# Patient Record
(demographics unavailable — no encounter records)

---

## 2025-02-03 NOTE — PHYSICAL EXAM
[Appropriately responsive] : appropriately responsive [Alert] : alert [No Acute Distress] : no acute distress [Labia Majora] : normal [Labia Minora] : normal [Uterine Prolapse] : uterine prolapse [Normal] : normal [FreeTextEntry4] : ring with support removed, cleaned and replaced.

## 2025-03-10 NOTE — PLAN
[FreeTextEntry1] : Will refer to Heme/Onc but she is having trouble getting an appointment. Will reach out to the cancer caner direct team to schedule her an appointment. I recommended she discuss palliative care in addition to other treatments. She is interested in hospice when the time comes to make that decision as well.  Discussed clean eating (eg Mediterranean style eating plan) and regular exercise/staying as physically active as possible.  Include balance exercises and strength training and core strengthening exercises for bone health and to decrease risk for falls.  Reviewed importance of good self care (e.g. meditation, yoga, adequate rest, regular exercise, magnesium, clean eating, etc.).  Follow up for next physical in 10/2025 or sooner based on workup from Oncology.  Face-to-face time spent with patient, over half in discussion of the above diagnoses and treatment plan: 40 minutes.

## 2025-03-10 NOTE — ASSESSMENT
[FreeTextEntry1] : Patient has a history of asthma, GERD, hypertension, hypothyroidism, impaired glucose tolerance, osteopenia, chronic back pain, and scoliosis. She has a new diagnosis of pancreatic cancer.  She had 2 stents placed in her bile duct. She was monitored in the hospital and discharged on Thursday. She returned to the ER on Saturday with pain and had run out of Dilaudid. She had another CT scan and her LFTs were improved. She was discharged with a prescription for Dilaudid 2 mg (15 tablets). She is using this every 6 hours and needs a refill.  She was referred to Oncology, Dr. Ronquillo or Dr. Garrett, on 3/7 but she stated that she wanted to discuss this in person. Her daughter also called [facility] but does not have an appointment yet.  She is not sure that she wants to pursue aggressive treatment for her cancer if it will prolong her life but she will be uncomfortable. She would prefer to be comfortable even if that means her life expectancy is shorter.  She is taking Gabapentin for her pain in addition to Dilaudid.  She has gained 17 pounds of water weight. She doubled her Lasix and has lost 3 pounds. She developed cramps in her hands however because she forgot to take her potassium with the Lasix. I advised her to take the potassium each time she takes Lasix, and increase the potassium dose according to the Lasix dose.  She has been constipated as well. She takes Dulcolax but it is not helping. She will try Miralax.

## 2025-03-10 NOTE — HEALTH RISK ASSESSMENT
[No falls in past year] : Patient reported no falls in the past year [0] : 2) Feeling down, depressed, or hopeless: Not at all (0) [PHQ-2 Negative - No further assessment needed] : PHQ-2 Negative - No further assessment needed [Never] : Never [[id number]] : 0

## 2025-03-10 NOTE — PHYSICAL EXAM
[No Acute Distress] : no acute distress [Well Nourished] : well nourished [Well Developed] : well developed [Well-Appearing] : well-appearing [Normal Sclera/Conjunctiva] : normal sclera/conjunctiva [PERRL] : pupils equal round and reactive to light [EOMI] : extraocular movements intact [Normal Outer Ear/Nose] : the outer ears and nose were normal in appearance [Normal Oropharynx] : the oropharynx was normal [No JVD] : no jugular venous distention [No Lymphadenopathy] : no lymphadenopathy [Supple] : supple [No Respiratory Distress] : no respiratory distress  [No Accessory Muscle Use] : no accessory muscle use [Clear to Auscultation] : lungs were clear to auscultation bilaterally [Normal Rate] : normal rate  [Regular Rhythm] : with a regular rhythm [Normal S1, S2] : normal S1 and S2 [No Murmur] : no murmur heard [No Carotid Bruits] : no carotid bruits [No Varicosities] : no varicosities [Pedal Pulses Present] : the pedal pulses are present [No Edema] : there was no peripheral edema [No Extremity Clubbing/Cyanosis] : no extremity clubbing/cyanosis [Soft] : abdomen soft [Non Tender] : non-tender [Non-distended] : non-distended [No Masses] : no abdominal mass palpated [Normal Bowel Sounds] : normal bowel sounds [No CVA Tenderness] : no CVA  tenderness [No Spinal Tenderness] : no spinal tenderness [No Joint Swelling] : no joint swelling [Grossly Normal Strength/Tone] : grossly normal strength/tone [No Rash] : no rash [Coordination Grossly Intact] : coordination grossly intact [No Focal Deficits] : no focal deficits [Normal Gait] : normal gait [Normal Affect] : the affect was normal [Normal Insight/Judgement] : insight and judgment were intact

## 2025-03-10 NOTE — HISTORY OF PRESENT ILLNESS
[Other: _____] : [unfilled] [FreeTextEntry1] : NICKOLAS SHAW is a 82 year old female here for a follow up visit. [de-identified] : Patient has a history of asthma, GERD, hypertension, hypothyroidism, impaired glucose tolerance, osteopenia, chronic back pain, and scoliosis.  Her last visit was in 9/2024 for her annual physical. Her labs were all stable and she was advised to follow up in 6-12 months. She scheduled this visit for follow up at that time.  In the meantime she was diagnosed with pancreatic cancer. She called the office on 2/28/25 stating that she was in [place] with her sister and had right upper quadrant abdominal pain associated with a fever, nausea, and vomiting. Her symptoms were suggestive of a gallstone and she was advised to go to the nearest ER for evaluation.   She had an endoscopic ultrasound done in [place] on 3/3/25 for a pancreatic mass seen on an MRI. Her daughter called the office stating that the diagnosis was pancreatic cancer and requested a referral to Oncology.

## 2025-03-11 NOTE — RESULTS/DATA
[FreeTextEntry1] : 3/1/25 CT A/P: Limited evaluation due to the absence of intravenous contrast. Redemonstrated biliary and pancreatic ductal dilation likely secondary to a pancreatic malignancy, which is questionably identified on the current study in the uncinate process. massively dilated gallbladder, likely related to biliary obstruction by the presumed pancreatic mass. Associated mild gallbladder mural thickening and minimal adjacent fat stranding likely reflects superimposed inflammation. Indeterminate upper abdominal subcentimeter in short axis lymph nodes, 1.0 x 0.9 cm. Indeterminate subcentimeter left renal lesion, which may represent a proteinaceous cyst or potentially a small renal neoplasm, 0.7 cm.   3/1/25 US Abd: Moderate intrahepatic bile duct dilation of the extrahepatic bile duct up to 1.4 cm and dilation of the main pancreatic duct up to 8 mm. These findings may be secondary to an obstructing mass or less likely a ductal calculus (dilation of both biliary and pancreatic ducts is atypical for a calculus). Dilated gallbladder with questionable mild wall thickening but no evidence of gallstones, favored to be related to the process causing biliary and pancreatic ductal obstruction.

## 2025-03-11 NOTE — CONSULT LETTER
[Dear  ___] : Dear  [unfilled], [Consult Letter:] : I had the pleasure of evaluating your patient, [unfilled]. [Please see my note below.] : Please see my note below. [Consult Closing:] : Thank you very much for allowing me to participate in the care of this patient.  If you have any questions, please do not hesitate to contact me. [Sincerely,] : Sincerely, [DrAlberto  ___] : Dr. HARRISON [FreeTextEntry3] : Dr. Abel Garrett

## 2025-03-14 NOTE — REVIEW OF SYSTEMS
[Fatigue] : fatigue [Lower Ext Edema] : lower extremity edema [Abdominal Pain] : abdominal pain [Constipation] : constipation [Diarrhea: Grade 0] : Diarrhea: Grade 0 [Anxiety] : anxiety [Depression] : depression [Negative] : Allergic/Immunologic [Fever] : no fever [Chills] : no chills [Night Sweats] : no night sweats [Recent Change In Weight] : ~T no recent weight change

## 2025-03-14 NOTE — PHYSICAL EXAM
[Restricted in physically strenuous activity but ambulatory and able to carry out work of a light or sedentary nature] : Status 1- Restricted in physically strenuous activity but ambulatory and able to carry out work of a light or sedentary nature, e.g., light house work, office work [Normal] : affect appropriate [de-identified] : + LE edema, L>R

## 2025-03-14 NOTE — HISTORY OF PRESENT ILLNESS
[Disease: _____________________] : Disease: [unfilled] [de-identified] : CEA 1.6, CA 19-9 22 [T: ___] : T[unfilled] [N: ___] : N[unfilled] [M: ___] : M[unfilled] [AJCC Stage: ____] : AJCC Stage: [unfilled] [de-identified] : 81 y/o F presents for management of pancreatic adenocarcinoma.   F PMHx asthma, GERD, HTN, hypothyroidism, impaired glucose tolerance, osteopenia, chronic back pain.  Patient presented to [facility] ED 3/1/25 with RUQ abdominal pain x 2 weeks. Associated with nausea/vomiting, fatigue and 10 lbs unintentional weight loss.  Labs notable for , , Alk Phos 510, bilirubin 2/7, lipase 458. CT AP without contrast showed biliary and pancreatic ductal dilation likely secondary to a pancreatic malignancy, which is questionably identified on the current study in the uncinate process. Recommend further evaluation with enhanced abdominal MRI or endoscopic ultrasound w/ possible biopsy. Massively dilated gallbladder, likely related to biliary obstruction by the presumed pancreatic mass. Associated mild gallbladder mural thickening and minimal adjacent fat stranding likely reflects superimposed inflammation. MRI abd without contrast 3/2/25 limited, redemonstration biliary and pancreatic ductal dilation likely secondary to a pancreatic head malignancy. No discrete mass is identified on unenhanced MRI in the region of abrupt duct termination. A 2.5 cm branching cystic lesion in the medial pancreatic uncinate process may reflect localized sidebranch ductal dilation vs. a sidebranch IPMN. Massively dilated GB, likely related to biliary obstruction by the presumed pancreatic mass, indeterminate upper abdominal subcentimeter in short axis lymph nodes, large hiatal hernia. She had ERCP 3/3/25 w/ biliary sphincterotomy, insertion of uncovered self expandable metal stent into the CBD. There was severe stenosis in the distal common bile duct concerning for malignant stricture, cytology performed. By Endoscopic criteria, she is T2N0. Pathology showed head of pancreas mass bx consistent with adenocarcinoma, moderately differentiated, bile duct brushings w/ few atypical glandular cells, suspicious for adenocarcinoma. Patient discharged wpain medication and recommendations to follow up with Medical Oncololgy outpatient.   Social: Lives alone w/ 3 Cats. 3 children (Chyna, Dick, Mallory) Chyna is her HCP. 5 grandchildren  Fhx: Father colon cancer dx 89, mother breast cancer 39, brother prostate early 60s, brother prostate & esophageal cancer alive, half brother prostate cancer alive PSHx: carotid artery stenosis s/p endarterectomy (12/2009), angioplasty/atherectomy of left SFA, b/l knee replacements, cataract removal  Smoking: never ETOH: never drug use: none   [de-identified] : adenocarcinoma, moderately differentiated  [FreeTextEntry1] : initial visit  [de-identified] : Here with her daughter, Chyna Nair (HCP)  C/o abdominal pain, pain is severe at times, takes Dilaudod 2 mg tabs Q 4-6 hrs which helps control the pain temporarily. Waits till pain is 9/10 before taking it.  lower appetite, fatigue C/o left groin pain x 48 hours, a/w L LE edema, worse than R + constipation, taking Miralax daily x 2 days, dulcolax does not help  LE edema b/l chronic. but recently gained 17 lb fluid gain, takes Lasix chronically due to venous insufficiency  tired but able to do ADLs still working, drives, lives alone has 3 cats which she cares for   taking Dilaudid 2 mg = every 6

## 2025-03-14 NOTE — HISTORY OF PRESENT ILLNESS
[Disease: _____________________] : Disease: [unfilled] [de-identified] : CEA 1.6, CA 19-9 22 [T: ___] : T[unfilled] [N: ___] : N[unfilled] [M: ___] : M[unfilled] [AJCC Stage: ____] : AJCC Stage: [unfilled] [de-identified] : 83 y/o F presents for management of pancreatic adenocarcinoma.   F PMHx asthma, GERD, HTN, hypothyroidism, impaired glucose tolerance, osteopenia, chronic back pain.  Patient presented to [facility] ED 3/1/25 with RUQ abdominal pain x 2 weeks. Associated with nausea/vomiting, fatigue and 10 lbs unintentional weight loss.  Labs notable for , , Alk Phos 510, bilirubin 2/7, lipase 458. CT AP without contrast showed biliary and pancreatic ductal dilation likely secondary to a pancreatic malignancy, which is questionably identified on the current study in the uncinate process. Recommend further evaluation with enhanced abdominal MRI or endoscopic ultrasound w/ possible biopsy. Massively dilated gallbladder, likely related to biliary obstruction by the presumed pancreatic mass. Associated mild gallbladder mural thickening and minimal adjacent fat stranding likely reflects superimposed inflammation. MRI abd without contrast 3/2/25 limited, redemonstration biliary and pancreatic ductal dilation likely secondary to a pancreatic head malignancy. No discrete mass is identified on unenhanced MRI in the region of abrupt duct termination. A 2.5 cm branching cystic lesion in the medial pancreatic uncinate process may reflect localized sidebranch ductal dilation vs. a sidebranch IPMN. Massively dilated GB, likely related to biliary obstruction by the presumed pancreatic mass, indeterminate upper abdominal subcentimeter in short axis lymph nodes, large hiatal hernia. She had ERCP 3/3/25 w/ biliary sphincterotomy, insertion of uncovered self expandable metal stent into the CBD. There was severe stenosis in the distal common bile duct concerning for malignant stricture, cytology performed. By Endoscopic criteria, she is T2N0. Pathology showed head of pancreas mass bx consistent with adenocarcinoma, moderately differentiated, bile duct brushings w/ few atypical glandular cells, suspicious for adenocarcinoma. Patient discharged wpain medication and recommendations to follow up with Medical Oncololgy outpatient.   Social: Lives alone w/ 3 Cats. 3 children (Chyna, Dick, Mallory) Chyna is her HCP. 5 grandchildren  Fhx: Father colon cancer dx 89, mother breast cancer 39, brother prostate early 60s, brother prostate & esophageal cancer alive, half brother prostate cancer alive PSHx: carotid artery stenosis s/p endarterectomy (12/2009), angioplasty/atherectomy of left SFA, b/l knee replacements, cataract removal  Smoking: never ETOH: never drug use: none   [de-identified] : adenocarcinoma, moderately differentiated  [FreeTextEntry1] : initial visit  [de-identified] : Here with her daughter, Chyna Nair (HCP)  C/o abdominal pain, pain is severe at times, takes Dilaudod 2 mg tabs Q 4-6 hrs which helps control the pain temporarily. Waits till pain is 9/10 before taking it.  lower appetite, fatigue C/o left groin pain x 48 hours, a/w L LE edema, worse than R + constipation, taking Miralax daily x 2 days, dulcolax does not help  LE edema b/l chronic. but recently gained 17 lb fluid gain, takes Lasix chronically due to venous insufficiency  tired but able to do ADLs still working, drives, lives alone has 3 cats which she cares for   taking Dilaudid 2 mg = every 6

## 2025-03-14 NOTE — ASSESSMENT
[Future Reassessment of Pain Scale] : Future reassessment of pain scale    [Medication(s)] : Medication(s) [Referred to Palliative/Pain management] : Referred to Palliative/Pain management [Palliative Care Plan] : not applicable at this time [FreeTextEntry1] : unknown at this time

## 2025-03-14 NOTE — PHYSICAL EXAM
[Restricted in physically strenuous activity but ambulatory and able to carry out work of a light or sedentary nature] : Status 1- Restricted in physically strenuous activity but ambulatory and able to carry out work of a light or sedentary nature, e.g., light house work, office work [Normal] : affect appropriate [de-identified] : + LE edema, L>R

## 2025-03-21 NOTE — ASSESSMENT
[______] : HCP: [unfilled] [FreeTextEntry1] : 82yoF with:   # Pancreatic Adenocarcinoma - At this time patient has decided for a palliative/comfort-focused approach.    # Pain 2/2 Neoplasm - c/w Methadone 2.5mg BID, PRN Hydromorphone 2mg   # Nausea - c/w PRN ondansetron   # b/l LE edema - chronic issue 2/2 venous insufficiency. Wears compression stockings and uses furosemide 20mg PRN.  #  Encounter for palliative care- emotional support provided. Explained the role of palliative care in enhancing quality of life in the setting of serious illness. Patient has HCP and advance directive forms at home. Requested a copy be sent in for our records.   Follow up in 2 weeks, call sooner with questions or issues.

## 2025-03-21 NOTE — DATA REVIEWED
[FreeTextEntry1] : CT Chest (3/14/2025) FINDINGS:  LUNGS/AIRWAYS/PLEURA:Patent trachea and bronchi. Right lower lobe calcified granuloma. Trace left pleural effusion.  LYMPH NODES/MEDIASTINUM: Moderate hiatal hernia. No lymphadenopathy.  HEART/VASCULATURE: Normal sized heart and aorta. No pericardial effusion. Coronary artery calcifications.  UPPER ABDOMEN: Partially included pneumobilia.  BONES/SOFT TISSUES: Multilevel spondylosis.  IMPRESSION: Trace left pleural effusion.  --- End of Report ---   US NINA LE (3/14/2025) FINDINGS:  RIGHT: Normal compressibility of the RIGHT common femoral, femoral and popliteal veins. Doppler examination shows normal spontaneous and phasic flow. No RIGHT calf vein thrombosis is detected.  LEFT: Normal compressibility of the LEFT common femoral, femoral and popliteal veins. Doppler examination shows normal spontaneous and phasic flow. No LEFT calf vein thrombosis is detected.  IMPRESSION: No evidence of deep venous thrombosis in either lower extremity.

## 2025-03-21 NOTE — HISTORY OF PRESENT ILLNESS
[FreeTextEntry1] : 82yoF with recently diagnosed pancreatic adenocarcinoma presents for initial palliative care visit, referred by Dr. King.  PMH significant for cataracts, Carotid Thromboendarterectomy, GERD, HTN, hyperlipidemia, hypothyroidism, osteopenia, chronic back pain, PAD, scoliosis, chronic venous insufficiency, b/l knee replacements.  Patient was in [place] for the annual flower show in late February when on 2/27/25 at her sister's house she developed chills, vomiting. She presented to [facility] ED 3/1/25 with RUQ pain, n/v, fatigue.  Labs notable for , , Alk Phos 510, bilirubin 2/7, lipase 458. CT AP without contrast showed biliary and pancreatic ductal dilation likely secondary to a pancreatic malignancy. Massively dilated gallbladder, likely related to biliary obstruction by the presumed pancreatic mass. Associated mild gallbladder mural thickening and minimal adjacent fat stranding likely reflects superimposed inflammation. She had ERCP 3/3/25 w/ biliary sphincterotomy, insertion of uncovered self-expandable metal stent into the CBD. There was severe stenosis in the distal common bile duct concerning for malignant stricture, cytology performed. By Endoscopic criteria, she is T2N0. Pathology showed head of pancreas mass bx c/w adenocarcinoma, moderately differentiated, bile duct brushings w/ few atypical glandular cells, suspicious for adenocarcinoma.   Main reason for which patient is referred today is to discuss pain and symptom management. She is accompanied by her daughter, Chyna.  Pt has received various oncologic opinions and has decided to pursue a palliative/comfort-focused approach. She has been suffering with pain in her RUQ/epigastrium and was previously using hydromorphone 2mg very frequently.  She was started on opioid regimen and pain is presently better controlled.   Current analgesic regimen: Methadone 2.5mg BID (started on 3/14/25) Hydromorphone 2mg PRN   ROS: +appetite loss +fatigue +b/l LE edema - chronic issue form chronic venous insufficiency - on PRN furosemide 20mg + constipation - controlled with senna and miralax daily +chronic back pain - limits her ability to walk a long distance +sleep has improved since initiation of methadone last week +occasional nausea - uses ondansetron PRN Remainder of ROS non-contributory  Uses a walking stick for assistance with ambulation. She had a fall two months ago. She wears a life alert wristwatch.  Patient is , lives alone in a town house with an elevator. She has 3 cats (Emperatriz, Miguel and Gunjan). She has 3 children (Chyna, Dick, Mallory), 5 grandchildren. Chyna is her HCP. 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  She enjoys photography, driving. Not Mosque or spiritual.   PCP: Dr. Reagan Ma Vascular: Dr. Ryder ([facility])   I-STOP Ref#: [id number]

## 2025-03-21 NOTE — PHYSICAL EXAM
[General Appearance - Alert] : alert [General Appearance - In No Acute Distress] : in no acute distress [Sclera] : the sclera and conjunctiva were normal [Normal Oral Mucosa] : normal oral mucosa [Neck Appearance] : the appearance of the neck was normal [] : no respiratory distress [Auscultation Breath Sounds / Voice Sounds] : lungs were clear to auscultation bilaterally [Heart Rate And Rhythm] : heart rate was normal and rhythm regular [Heart Sounds] : normal S1 and S2 [FreeTextEntry1] : b/l LE edema, pt wearing compression stockings [Bowel Sounds] : normal bowel sounds [Abdomen Soft] : soft [Abdomen Tenderness] : non-tender [Skin Color & Pigmentation] : normal skin color and pigmentation [No Focal Deficits] : no focal deficits [Oriented To Time, Place, And Person] : oriented to person, place, and time [Affect] : the affect was normal

## 2025-04-02 NOTE — REASON FOR VISIT
[Other: _____] : [unfilled] [Home] : at home, [unfilled] , at the time of the visit. [Medical Office: ([facility])___] : at the medical office located in  [Telehealth (audio & video)] : This visit was provided via telehealth using real-time 2-way audio visual technology. [Verbal consent obtained from patient] : the patient, [unfilled] [Follow-Up] : a follow-up visit

## 2025-04-02 NOTE — REASON FOR VISIT
[Other: _____] : [unfilled] [Home] : at home, [unfilled] , at the time of the visit. [Medical Office: ([facility])___] : at the medical office located in  [Telehealth (audio & video)] : This visit was provided via telehealth using real-time 2-way audio visual technology. [Verbal consent obtained from patient] : the patient, [unfilled] [Follow-Up] : a follow-up visit

## 2025-04-02 NOTE — ASSESSMENT
[______] : HCP: [unfilled] [FreeTextEntry1] : 82yoF with:   # Pancreatic Adenocarcinoma - At this time patient has decided for a palliative/comfort-focused approach.    # Dizziness - Patient states she is dizzy with ambulation, currently alone in the home. Recommend evaluation by ED for fluid status and to r/o cardiac causes. Patient states she spoke with her nephew who recommended she stay home and repeat dose of ondansetron, consume two glasses of fluids. Patient states her family friend will come over to assist her. Educated on falls prevention. [facility] ambulance number provided to patient.   # Nausea - Rx olanzapine 2.5mg QHS. May continue PRN ondansetron, recommend increasing the dose to 8mg q8hr. Recommend avoiding taking medications on a empty stomach.    # Blood in stool with unclear etiology - Possible ?hemorrhoids, although patient denies presently. Recommend physical exam and lab work to rule out drop in H/H. Patient defers.   # Pain 2/2 Neoplasm - Controlled. C/w Methadone 2.5mg BID, PRN Hydromorphone 2mg.   # b/l LE edema - chronic issue 2/2 venous insufficiency. Wears compression stockings and uses furosemide 20mg PRN.  #  Encounter for palliative care- emotional support provided. Explained the role of palliative care in enhancing quality of life in the setting of serious illness. Patient has HCP and advance directive forms at home. Requested a copy be sent in for our records.   Follow up in 2 weeks, call sooner with questions or issues.

## 2025-04-02 NOTE — HISTORY OF PRESENT ILLNESS
[FreeTextEntry1] : 82yoF with recently diagnosed pancreatic adenocarcinoma presents for initial palliative care visit, referred by Dr. King.  PMH significant for cataracts, Carotid Thromboendarterectomy, GERD, HTN, hyperlipidemia, hypothyroidism, osteopenia, chronic back pain, PAD, scoliosis, chronic venous insufficiency, b/l knee replacements.  Patient was in [place] for the annual flower show in late February when on 2/27/25 at her sister's house she developed chills, vomiting. She presented to [facility] ED 3/1/25 with RUQ pain, n/v, fatigue.  Labs notable for , , Alk Phos 510, bilirubin 2/7, lipase 458. CT AP without contrast showed biliary and pancreatic ductal dilation likely secondary to a pancreatic malignancy. Massively dilated gallbladder, likely related to biliary obstruction by the presumed pancreatic mass. Associated mild gallbladder mural thickening and minimal adjacent fat stranding likely reflects superimposed inflammation. She had ERCP 3/3/25 w/ biliary sphincterotomy, insertion of uncovered self-expandable metal stent into the CBD. There was severe stenosis in the distal common bile duct concerning for malignant stricture, cytology performed. By Endoscopic criteria, she is T2N0. Pathology showed head of pancreas mass bx c/w adenocarcinoma, moderately differentiated, bile duct brushings w/ few atypical glandular cells, suspicious for adenocarcinoma.   Main reason for which patient is referred today is to discuss pain and symptom management. She is accompanied by her daughter, Chyna.  Pt has received various oncologic opinions and has decided to pursue a palliative/comfort-focused approach. She has been suffering with pain in her RUQ/epigastrium and was previously using hydromorphone 2mg very frequently.  She was started on opioid regimen and pain is presently better controlled.   Interval history (4/2/25): Patient presents for urgent telehealth visit.  She has been feeling unwell since waking up this morning with nausea and retching. She reports using PRN ondansetron 4mg at 10am and 12pm with no relief in her symptoms. She ate yogurt this afternoon which exacerbated her symptoms.  She reports constipation for three days which was relieved with senna 3 tablets last night. She moved her bowels twice today with blood noted in the toilet bowl and with wiping. Unable to quantify how much blood was passed but states the water in the toilet bowl turned red.   She reports dizziness with ambulating, this resolves when she closes her eyes. Denies chest pain, shortness of breath, near syncope, falls.  Pain controlled on her current pain regimen. Increased anxiety regarding being alone and feeling unwell. She has anxiety about going to the hospital due to a bad experience in the past with a knee replacement.   Current analgesic regimen: Methadone 2.5mg BID (started on 3/14/25) Hydromorphone 2mg PRN   ROS: +appetite loss +nausea with dry heaves - uses ondansetron PRN +fatigue +b/l LE edema - chronic issue form chronic venous insufficiency - on PRN furosemide 20mg + constipation - controlled with senna and miralax daily +chronic back pain - limits her ability to walk a long distance +sleep has improved since initiation of methadone last week Remainder of ROS non-contributory  Uses a walking stick for assistance with ambulation. She had a fall two months ago. She wears a life alert wristwatch.  Patient is , lives alone in a town house with an elevator. She has 3 cats (Emperatriz, Miguel and Gunjan). She has 3 children (Chyna, Dick, Mallory), 5 grandchildren. Chyna is her HCP. 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  She enjoys photography, driving. Not Yazdanism or spiritual.   PCP: Dr. Reagan Ma Vascular: Dr. Ryder ([facility])   I-STOP Ref#: [id number]

## 2025-04-02 NOTE — ASSESSMENT
[______] : HCP: [unfilled] [FreeTextEntry1] : 82yoF with:   # Pancreatic Adenocarcinoma - At this time patient has decided for a palliative/comfort-focused approach.    # Dizziness - Patient states she is dizzy with ambulation, currently alone in the home. Recommend evaluation by ED for fluid status and to r/o cardiac causes. Patient states she spoke with her nephew who recommended she stay home and repeat dose of ondansetron, consume two glasses of fluids. Patient states her family friend will come over to assist her. Educated on falls prevention. [facility] ambulance number provided to patient.   # Nausea - Rx olanzapine 2.5mg QHS. May continue PRN ondansetron, recommend increasing the dose to 8mg q8hr. Recommend avoiding taking medications on a empty stomach.    # Blood in stool with unclear etiology - Possible ?hemorrhoids, although patient denies presently. Recommend physical exam and lab work to rule out drop in H/H. Patient defers.   # Pain 2/2 Neoplasm - Controlled. C/w Methadone 2.5mg BID, PRN Hydromorphone 2mg.   # b/l LE edema - chronic issue 2/2 venous insufficiency. Wears compression stockings and uses furosemide 20mg PRN.  #  Encounter for palliative care- emotional support provided. Explained the role of palliative care in enhancing quality of life in the setting of serious illness. Patient has HCP and advance directive forms at home. Requested a copy be sent in for our records.   Follow up in 2 weeks, call sooner with questions or issues.

## 2025-04-02 NOTE — HISTORY OF PRESENT ILLNESS
[FreeTextEntry1] : 82yoF with recently diagnosed pancreatic adenocarcinoma presents for initial palliative care visit, referred by Dr. King.  PMH significant for cataracts, Carotid Thromboendarterectomy, GERD, HTN, hyperlipidemia, hypothyroidism, osteopenia, chronic back pain, PAD, scoliosis, chronic venous insufficiency, b/l knee replacements.  Patient was in [place] for the annual flower show in late February when on 2/27/25 at her sister's house she developed chills, vomiting. She presented to [facility] ED 3/1/25 with RUQ pain, n/v, fatigue.  Labs notable for , , Alk Phos 510, bilirubin 2/7, lipase 458. CT AP without contrast showed biliary and pancreatic ductal dilation likely secondary to a pancreatic malignancy. Massively dilated gallbladder, likely related to biliary obstruction by the presumed pancreatic mass. Associated mild gallbladder mural thickening and minimal adjacent fat stranding likely reflects superimposed inflammation. She had ERCP 3/3/25 w/ biliary sphincterotomy, insertion of uncovered self-expandable metal stent into the CBD. There was severe stenosis in the distal common bile duct concerning for malignant stricture, cytology performed. By Endoscopic criteria, she is T2N0. Pathology showed head of pancreas mass bx c/w adenocarcinoma, moderately differentiated, bile duct brushings w/ few atypical glandular cells, suspicious for adenocarcinoma.   Main reason for which patient is referred today is to discuss pain and symptom management. She is accompanied by her daughter, Chyna.  Pt has received various oncologic opinions and has decided to pursue a palliative/comfort-focused approach. She has been suffering with pain in her RUQ/epigastrium and was previously using hydromorphone 2mg very frequently.  She was started on opioid regimen and pain is presently better controlled.   Interval history (4/2/25): Patient presents for urgent telehealth visit.  She has been feeling unwell since waking up this morning with nausea and retching. She reports using PRN ondansetron 4mg at 10am and 12pm with no relief in her symptoms. She ate yogurt this afternoon which exacerbated her symptoms.  She reports constipation for three days which was relieved with senna 3 tablets last night. She moved her bowels twice today with blood noted in the toilet bowl and with wiping. Unable to quantify how much blood was passed but states the water in the toilet bowl turned red.   She reports dizziness with ambulating, this resolves when she closes her eyes. Denies chest pain, shortness of breath, near syncope, falls.  Pain controlled on her current pain regimen. Increased anxiety regarding being alone and feeling unwell. She has anxiety about going to the hospital due to a bad experience in the past with a knee replacement.   Current analgesic regimen: Methadone 2.5mg BID (started on 3/14/25) Hydromorphone 2mg PRN   ROS: +appetite loss +nausea with dry heaves - uses ondansetron PRN +fatigue +b/l LE edema - chronic issue form chronic venous insufficiency - on PRN furosemide 20mg + constipation - controlled with senna and miralax daily +chronic back pain - limits her ability to walk a long distance +sleep has improved since initiation of methadone last week Remainder of ROS non-contributory  Uses a walking stick for assistance with ambulation. She had a fall two months ago. She wears a life alert wristwatch.  Patient is , lives alone in a town house with an elevator. She has 3 cats (Emperatriz, Miguel and Gunjan). She has 3 children (Chyna, Dick, Mallory), 5 grandchildren. Chyna is her HCP. 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  She enjoys photography, driving. Not Oriental orthodox or spiritual.   PCP: Dr. Reagan Ma Vascular: Dr. Ryder ([facility])   I-STOP Ref#: [id number]

## 2025-04-07 NOTE — ASSESSMENT
[FreeTextEntry1] : 82yoF with:   # Pancreatic Adenocarcinoma - At this time patient has decided for a palliative/comfort-focused approach.    # Dizziness - Patient states she is dizzy with ambulation, currently alone in the home. Recommend evaluation by ED for fluid status and to r/o cardiac causes. Patient states she spoke with her nephew who recommended she stay home and repeat dose of ondansetron, consume two glasses of fluids. Patient states her family friend will come over to assist her. Educated on falls prevention. [facility] ambulance number provided to patient.   # Nausea - Rx olanzapine 2.5mg QHS. May continue PRN ondansetron, recommend increasing the dose to 8mg q8hr. Recommend avoiding taking medications on a empty stomach.    # Blood in stool with unclear etiology - Possible ?hemorrhoids, although patient denies presently. Recommend physical exam and lab work to rule out drop in H/H. Patient defers.   # Pain 2/2 Neoplasm - Controlled. C/w Methadone 2.5mg BID, PRN Hydromorphone 2mg.  - Educated on the importance of maintaining bowel regularity in light of opioid use.  # b/l LE edema - chronic issue 2/2 venous insufficiency. Wears compression stockings and uses furosemide 20mg PRN.  #  Encounter for palliative care- emotional support provided. Explained the role of palliative care in enhancing quality of life in the setting of serious illness. Patient has HCP and advance directive forms at home. Requested a copy be sent in for our records.   Follow up in 2 weeks, call sooner with questions or issues.

## 2025-04-07 NOTE — HISTORY OF PRESENT ILLNESS
[FreeTextEntry1] : 82yoF with recently diagnosed pancreatic adenocarcinoma presents for initial palliative care visit, referred by Dr. King.  PMH significant for cataracts, Carotid Thromboendarterectomy, GERD, HTN, hyperlipidemia, hypothyroidism, osteopenia, chronic back pain, PAD, scoliosis, chronic venous insufficiency, b/l knee replacements.  Patient was in [place] for the annual flower show in late February when on 2/27/25 at her sister's house she developed chills, vomiting. She presented to [facility] ED 3/1/25 with RUQ pain, n/v, fatigue.  Labs notable for , , Alk Phos 510, bilirubin 2/7, lipase 458. CT AP without contrast showed biliary and pancreatic ductal dilation likely secondary to a pancreatic malignancy. Massively dilated gallbladder, likely related to biliary obstruction by the presumed pancreatic mass. Associated mild gallbladder mural thickening and minimal adjacent fat stranding likely reflects superimposed inflammation. She had ERCP 3/3/25 w/ biliary sphincterotomy, insertion of uncovered self-expandable metal stent into the CBD. There was severe stenosis in the distal common bile duct concerning for malignant stricture, cytology performed. By Endoscopic criteria, she is T2N0. Pathology showed head of pancreas mass bx c/w adenocarcinoma, moderately differentiated, bile duct brushings w/ few atypical glandular cells, suspicious for adenocarcinoma.   Main reason for which patient is referred today is to discuss pain and symptom management. She is accompanied by her daughter, Chyna.  Pt has received various oncologic opinions and has decided to pursue a palliative/comfort-focused approach. She has been suffering with pain in her RUQ/epigastrium and was previously using hydromorphone 2mg very frequently.  She was started on opioid regimen and pain is presently better controlled.   Interval history (4/2/25): Patient presents for urgent telehealth visit.  She has been feeling unwell since waking up this morning with nausea and retching. She reports using PRN ondansetron 4mg at 10am and 12pm with no relief in her symptoms. She ate yogurt this afternoon which exacerbated her symptoms.  She reports constipation for three days which was relieved with senna 3 tablets last night. She moved her bowels twice today with blood noted in the toilet bowl, and with wiping. Unable to quantify how much blood was passed but states the water in the toilet bowl "turned red".   She reports dizziness with ambulating, this resolves when she closes her eyes. Denies chest pain, shortness of breath, near syncope, falls.  Pain controlled on her current pain regimen. Increased anxiety regarding being alone and feeling unwell. She has anxiety about going to the hospital due to a bad experience in the past with a knee replacement.   Current analgesic regimen: Methadone 2.5mg BID (started on 3/14/25) Hydromorphone 2mg PRN   ROS: +appetite loss +nausea with dry heaves - uses ondansetron PRN +fatigue +b/l LE edema - chronic issue form chronic venous insufficiency - on PRN furosemide 20mg + constipation - controlled with senna and miralax daily +chronic back pain - limits her ability to walk a long distance +sleep has improved since initiation of methadone last week Remainder of ROS non-contributory  Uses a walking stick for assistance with ambulation. She had a fall two months ago. She wears a life alert wristwatch.  Patient is , lives alone in a town house with an elevator. She has 3 cats (Emperatriz, Miguel and Gunjan). She has 3 children (Chyna, Dick, Mallory), 5 grandchildren. Chyna is her HCP. 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  She enjoys photography, driving. Not Sikh or spiritual.   PCP: Dr. Reagan Ma Vascular: Dr. Ryder ([facility])   I-STOP Ref#: [id number]

## 2025-04-12 NOTE — REASON FOR VISIT
[Home] : at home, [unfilled] , at the time of the visit. [Telehealth (audio & video)] : This visit was provided via telehealth using real-time 2-way audio visual technology. [Verbal consent obtained from patient] : the patient, [unfilled] [Follow-Up] : a follow-up visit [Other: _____] : [unfilled] [Medical Office: ([facility])___] : at the medical office located in

## 2025-04-12 NOTE — ASSESSMENT
[______] : HCP: [unfilled] [FreeTextEntry1] : 82yoF with:   # Pancreatic Adenocarcinoma - patient has elected a palliative/comfort-focused approach.    # Nausea - c/w olanzapine 2.5mg QHS.    # Pain 2/2 Neoplasm - Controlled. C/w Methadone 2.5mg BID, PRN Hydromorphone 2mg.  - Educated on the importance of maintaining bowel regularity in light of opioid use.  # b/l LE edema - chronic issue 2/2 venous insufficiency. Wears compression stockings and uses furosemide 20mg PRN.  #  Encounter for palliative care- emotional support provided. Patient has HCP and advance directive forms at home. Requested a copy be sent in for our records.   Follow up in 2 weeks, call sooner with questions or issues.

## 2025-04-12 NOTE — HISTORY OF PRESENT ILLNESS
[FreeTextEntry1] : 82yoF with recently diagnosed pancreatic adenocarcinoma presents for follow up palliative care visit.  PMH significant for cataracts, Carotid Thromboendarterectomy, GERD, HTN, hyperlipidemia, hypothyroidism, osteopenia, chronic back pain, PAD, scoliosis, chronic venous insufficiency, b/l knee replacements.  Patient was in [place] for the annual flower show in late February when on 2/27/25 at her sister's house she developed chills, vomiting. She presented to [facility] ED 3/1/25 with RUQ pain, n/v, fatigue.  Labs notable for , , Alk Phos 510, bilirubin 2/7, lipase 458. CT AP without contrast showed biliary and pancreatic ductal dilation likely secondary to a pancreatic malignancy. Massively dilated gallbladder, likely related to biliary obstruction by the presumed pancreatic mass. Associated mild gallbladder mural thickening and minimal adjacent fat stranding likely reflects superimposed inflammation. She had ERCP 3/3/25 w/ biliary sphincterotomy, insertion of uncovered self-expandable metal stent into the CBD. There was severe stenosis in the distal common bile duct concerning for malignant stricture, cytology performed. By Endoscopic criteria, she is T2N0. Pathology showed head of pancreas mass bx c/w adenocarcinoma, moderately differentiated, bile duct brushings w/ few atypical glandular cells, suspicious for adenocarcinoma.   Main reason for which patient is referred today is to discuss pain and symptom management. She is accompanied by her daughter, Chyna.  Pt has received various oncologic opinions and has decided to pursue a palliative/comfort-focused approach. She has been suffering with pain in her RUQ/epigastrium and was previously using hydromorphone 2mg very frequently.  She was started on opioid regimen and pain is presently better controlled.   Interval history (4/11/25): Patient seen via telemedicine for palliative medicine follow up.  Pt reports that she has been feeling pretty well lately.  The nausea and vomiting has resolved with the addition of olanzapine 2.5mg QHS to her regimen.  She went out to lunch with her sister today and has found that her appetite has been pretty good. She has been able to put some weight back on and is seeking out higher calorie foods to eat.  Pain controlled on her current pain regimen.   Current analgesic regimen: Methadone 2.5mg BID  Hydromorphone 2mg PRN (has not used lately)  ROS: +b/l LE edema - chronic issue form chronic venous insufficiency - on PRN furosemide 20mg + constipation - controlled with senna and miralax daily; no longer having any bleeding from rectum +chronic back pain - limits her ability to walk a long distance Denies trouble sleeping  Uses a walking stick for assistance with ambulation. She had a fall two months ago. She wears a life alert wristwatch.  Patient is , lives alone in a town house with an elevator. She has 3 cats (Emperatriz, Miguel and Gunjan). She has 3 children (Chyna, Dick, Mallory), 5 grandchildren. Chyna is her HCP. 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  She enjoys photography, driving. Not Caodaism or spiritual.  She works part time for [employer], teaching senior citizens computer skills.   PCP: Dr. Reagan Ma Vascular: Dr. Ryder ([facility])   I-STOP Ref#: [id number]

## 2025-04-12 NOTE — REASON FOR VISIT
[Home] : at home, [unfilled] , at the time of the visit. [Telehealth (audio & video)] : This visit was provided via telehealth using real-time 2-way audio visual technology. [Verbal consent obtained from patient] : the patient, [unfilled] [Follow-Up] : a follow-up visit [Other: _____] : [unfilled] [Medical Office: ([facility])___] : at the medical office located in

## 2025-04-12 NOTE — HISTORY OF PRESENT ILLNESS
[FreeTextEntry1] : 82yoF with recently diagnosed pancreatic adenocarcinoma presents for follow up palliative care visit.  PMH significant for cataracts, Carotid Thromboendarterectomy, GERD, HTN, hyperlipidemia, hypothyroidism, osteopenia, chronic back pain, PAD, scoliosis, chronic venous insufficiency, b/l knee replacements.  Patient was in [place] for the annual flower show in late February when on 2/27/25 at her sister's house she developed chills, vomiting. She presented to [facility] ED 3/1/25 with RUQ pain, n/v, fatigue.  Labs notable for , , Alk Phos 510, bilirubin 2/7, lipase 458. CT AP without contrast showed biliary and pancreatic ductal dilation likely secondary to a pancreatic malignancy. Massively dilated gallbladder, likely related to biliary obstruction by the presumed pancreatic mass. Associated mild gallbladder mural thickening and minimal adjacent fat stranding likely reflects superimposed inflammation. She had ERCP 3/3/25 w/ biliary sphincterotomy, insertion of uncovered self-expandable metal stent into the CBD. There was severe stenosis in the distal common bile duct concerning for malignant stricture, cytology performed. By Endoscopic criteria, she is T2N0. Pathology showed head of pancreas mass bx c/w adenocarcinoma, moderately differentiated, bile duct brushings w/ few atypical glandular cells, suspicious for adenocarcinoma.   Main reason for which patient is referred today is to discuss pain and symptom management. She is accompanied by her daughter, Chyna.  Pt has received various oncologic opinions and has decided to pursue a palliative/comfort-focused approach. She has been suffering with pain in her RUQ/epigastrium and was previously using hydromorphone 2mg very frequently.  She was started on opioid regimen and pain is presently better controlled.   Interval history (4/11/25): Patient seen via telemedicine for palliative medicine follow up.  Pt reports that she has been feeling pretty well lately.  The nausea and vomiting has resolved with the addition of olanzapine 2.5mg QHS to her regimen.  She went out to lunch with her sister today and has found that her appetite has been pretty good. She has been able to put some weight back on and is seeking out higher calorie foods to eat.  Pain controlled on her current pain regimen.   Current analgesic regimen: Methadone 2.5mg BID  Hydromorphone 2mg PRN (has not used lately)  ROS: +b/l LE edema - chronic issue form chronic venous insufficiency - on PRN furosemide 20mg + constipation - controlled with senna and miralax daily; no longer having any bleeding from rectum +chronic back pain - limits her ability to walk a long distance Denies trouble sleeping  Uses a walking stick for assistance with ambulation. She had a fall two months ago. She wears a life alert wristwatch.  Patient is , lives alone in a town house with an elevator. She has 3 cats (Emperatriz, Miguel and Gunjan). She has 3 children (Chyna, Dick, Mallory), 5 grandchildren. Chyna is her HCP. 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  She enjoys photography, driving. Not Uatsdin or spiritual.  She works part time for [employer], teaching senior citizens computer skills.   PCP: Dr. Reagan Ma Vascular: Dr. Ryder ([facility])   I-STOP Ref#: [id number]

## 2025-04-12 NOTE — PHYSICAL EXAM
[General Appearance - Alert] : alert [General Appearance - In No Acute Distress] : in no acute distress [Oriented To Time, Place, And Person] : oriented to person, place, and time [Affect] : the affect was normal [FreeTextEntry1] : anxious

## 2025-04-12 NOTE — HISTORY OF PRESENT ILLNESS
[FreeTextEntry1] : 82yoF with recently diagnosed pancreatic adenocarcinoma presents for follow up palliative care visit.  PMH significant for cataracts, Carotid Thromboendarterectomy, GERD, HTN, hyperlipidemia, hypothyroidism, osteopenia, chronic back pain, PAD, scoliosis, chronic venous insufficiency, b/l knee replacements.  Patient was in [place] for the annual flower show in late February when on 2/27/25 at her sister's house she developed chills, vomiting. She presented to [facility] ED 3/1/25 with RUQ pain, n/v, fatigue.  Labs notable for , , Alk Phos 510, bilirubin 2/7, lipase 458. CT AP without contrast showed biliary and pancreatic ductal dilation likely secondary to a pancreatic malignancy. Massively dilated gallbladder, likely related to biliary obstruction by the presumed pancreatic mass. Associated mild gallbladder mural thickening and minimal adjacent fat stranding likely reflects superimposed inflammation. She had ERCP 3/3/25 w/ biliary sphincterotomy, insertion of uncovered self-expandable metal stent into the CBD. There was severe stenosis in the distal common bile duct concerning for malignant stricture, cytology performed. By Endoscopic criteria, she is T2N0. Pathology showed head of pancreas mass bx c/w adenocarcinoma, moderately differentiated, bile duct brushings w/ few atypical glandular cells, suspicious for adenocarcinoma.   Main reason for which patient is referred today is to discuss pain and symptom management. She is accompanied by her daughter, Chyna.  Pt has received various oncologic opinions and has decided to pursue a palliative/comfort-focused approach. She has been suffering with pain in her RUQ/epigastrium and was previously using hydromorphone 2mg very frequently.  She was started on opioid regimen and pain is presently better controlled.   Interval history (4/11/25): Patient seen via telemedicine for palliative medicine follow up.  Pt reports that she has been feeling pretty well lately.  The nausea and vomiting has resolved with the addition of olanzapine 2.5mg QHS to her regimen.  She went out to lunch with her sister today and has found that her appetite has been pretty good. She has been able to put some weight back on and is seeking out higher calorie foods to eat.  Pain controlled on her current pain regimen.   Current analgesic regimen: Methadone 2.5mg BID  Hydromorphone 2mg PRN (has not used lately)  ROS: +b/l LE edema - chronic issue form chronic venous insufficiency - on PRN furosemide 20mg + constipation - controlled with senna and miralax daily; no longer having any bleeding from rectum +chronic back pain - limits her ability to walk a long distance Denies trouble sleeping  Uses a walking stick for assistance with ambulation. She had a fall two months ago. She wears a life alert wristwatch.  Patient is , lives alone in a town house with an elevator. She has 3 cats (Emperatriz, Miguel and Gunjan). She has 3 children (Chyna, Dick, Mallory), 5 grandchildren. Chyna is her HCP. 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  She enjoys photography, driving. Not Judaism or spiritual.  She works part time for [employer], teaching senior citizens computer skills.   PCP: Dr. Reagan Ma Vascular: Dr. Ryder ([facility])   I-STOP Ref#: [id number]

## 2025-04-12 NOTE — REASON FOR VISIT
[Home] : at home, [unfilled] , at the time of the visit. [Telehealth (audio & video)] : This visit was provided via telehealth using real-time 2-way audio visual technology. [Verbal consent obtained from patient] : the patient, [unfilled] [Follow-Up] : a follow-up visit [Other: _____] : [unfilled] [Medical Office: ([facility])___] : at the medical office located in

## 2025-04-22 NOTE — REASON FOR VISIT
[Home] : at home, [unfilled] , at the time of the visit. [Medical Office: ([facility])___] : at the medical office located in  [Telehealth (audio & video)] : This visit was provided via telehealth using real-time 2-way audio visual technology. [Verbal consent obtained from patient] : the patient, [unfilled] [Follow-Up] : a follow-up visit [Other: _____] : [unfilled]

## 2025-04-22 NOTE — REASON FOR VISIT
[Home] : at home, [unfilled] , at the time of the visit. [Medical Office: ([facility])___] : at the medical office located in  [Telehealth (audio & video)] : This visit was provided via telehealth using real-time 2-way audio visual technology. [Verbal consent obtained from patient] : the patient, [unfilled] [Follow-Up] : a follow-up visit [Other: _____] : [unfilled]

## 2025-04-22 NOTE — REASON FOR VISIT
[Home] : at home, [unfilled] , at the time of the visit. [Medical Office: ([facility])___] : at the medical office located in  [Telehealth (audio & video)] : This visit was provided via telehealth using real-time 2-way audio visual technology. [Verbal consent obtained from patient] : the patient, [unfilled] [Follow-Up] : a follow-up visit [Other: _____] : [unfilled]

## 2025-04-28 NOTE — HISTORY OF PRESENT ILLNESS
[FreeTextEntry1] : 82yoF with recently diagnosed pancreatic adenocarcinoma presents for follow up palliative care visit.  PMH significant for cataracts, Carotid Thromboendarterectomy, GERD, HTN, hyperlipidemia, hypothyroidism, osteopenia, chronic back pain, PAD, scoliosis, chronic venous insufficiency, b/l knee replacements.  Patient was in [place] for the annual flower show in late February when on 2/27/25 at her sister's house she developed chills, vomiting. She presented to [facility] ED 3/1/25 with RUQ pain, n/v, fatigue.  Labs notable for , , Alk Phos 510, bilirubin 2/7, lipase 458. CT AP without contrast showed biliary and pancreatic ductal dilation likely secondary to a pancreatic malignancy. Massively dilated gallbladder, likely related to biliary obstruction by the presumed pancreatic mass. Associated mild gallbladder mural thickening and minimal adjacent fat stranding likely reflects superimposed inflammation. She had ERCP 3/3/25 w/ biliary sphincterotomy, insertion of uncovered self-expandable metal stent into the CBD. There was severe stenosis in the distal common bile duct concerning for malignant stricture, cytology performed. By Endoscopic criteria, she is T2N0. Pathology showed head of pancreas mass bx c/w adenocarcinoma, moderately differentiated, bile duct brushings w/ few atypical glandular cells, suspicious for adenocarcinoma.   Main reason for which patient is referred today is to discuss pain and symptom management. She is accompanied by her daughter, Chyna.  Pt has received various oncologic opinions and has decided to pursue a palliative/comfort-focused approach. She has been suffering with pain in her RUQ/epigastrium and was previously using hydromorphone 2mg very frequently.  She was started on opioid regimen and pain is presently better controlled.   Interval history (4/22/25): Patient seen via telemedicine for palliative medicine follow up.  RUQ pain is well controlled on her current pain regimen listed below. Chronic lower back pain has increased lately; it is exacerbated with ambulation. She was previously managing this pain with PRN Tylenol and sparing use of Advil; she has not used this lately as she was unsure it was safe with her pain regimen. Nausea and vomiting has resolved with the addition of olanzapine 2.5mg QHS to her regimen. Appetite is good, she is focusing on small frequent meals. She is happy to report she has regained some weight back. She is moving her bowels daily with Senna 1 tablet daily.  She reports a few days of fatigue, requiring an afternoon nap. Some days the fatigue is more prominent than others. Sleeping well. Mood is good. She went to the movies yesterday.   Current analgesic regimen: Methadone 2.5mg BID  Hydromorphone 2mg PRN (has not used lately)  ROS: +b/l LE edema - chronic issue form chronic venous insufficiency - on furosemide 20mg + constipation - controlled with senna and PRN miralax daily; no longer having any bleeding from rectum +chronic back pain - limits her ability to walk a long distance Denies trouble sleeping  Uses a walking stick for assistance with ambulation. She had a fall two months ago. She wears a life alert wristwatch.  Patient is , lives alone in a town house with an elevator. She has 3 cats (Emperatriz, Miguel and Gunjan). She has 3 children (Chyna, Dick, Mallory), 5 grandchildren. Chyna is her HCP. 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  She enjoys photography, driving. Not Scientology or spiritual.  She works part time for [employer], teaching senior citizens computer skills.   PCP: Dr. Reagan Ma Vascular: Dr. Ryder ([facility])   I-STOP Ref#: [id number]

## 2025-04-28 NOTE — PHYSICAL EXAM
[General Appearance - In No Acute Distress] : in no acute distress [General Appearance - Alert] : alert [Oriented To Time, Place, And Person] : oriented to person, place, and time [Affect] : the affect was normal [FreeTextEntry1] : anxious

## 2025-04-28 NOTE — ASSESSMENT
[______] : HCP: [unfilled] [FreeTextEntry1] : 82yoF with:   # Pancreatic Adenocarcinoma - patient has elected a palliative/comfort-focused approach.    # Nausea - C/w olanzapine 2.5mg QHS.   # Pain 2/2 Neoplasm/Chronic back pain - Controlled. C/w Methadone 2.5mg BID, PRN Hydromorphone 2mg.  - May use PRN Tylenol for mild to moderate pain. Cautioned patient of maximum dosage of Tylenol and prefer usage remain under 3gms/day. - Educated on the importance of maintaining bowel regularity in light of opioid use.  # Fatigue - likely related to cancer diagnosis. Encourage patient to schedule 1 to 2 short naps daily to restore energy. Stressed importance of maintaining physical activity daily - short walks, exercising upper and lower extremities while seated.   # b/l LE edema - chronic issue 2/2 venous insufficiency. Wears compression stockings and uses furosemide 20mg daily.  #  Encounter for palliative care- emotional support provided. Patient has HCP and advance directive forms at home. Requested a copy be sent in for our records.   Follow up in 3 weeks, call sooner with questions or issues.

## 2025-04-28 NOTE — HISTORY OF PRESENT ILLNESS
[FreeTextEntry1] : 82yoF with recently diagnosed pancreatic adenocarcinoma presents for follow up palliative care visit.  PMH significant for cataracts, Carotid Thromboendarterectomy, GERD, HTN, hyperlipidemia, hypothyroidism, osteopenia, chronic back pain, PAD, scoliosis, chronic venous insufficiency, b/l knee replacements.  Patient was in [place] for the annual flower show in late February when on 2/27/25 at her sister's house she developed chills, vomiting. She presented to [facility] ED 3/1/25 with RUQ pain, n/v, fatigue.  Labs notable for , , Alk Phos 510, bilirubin 2/7, lipase 458. CT AP without contrast showed biliary and pancreatic ductal dilation likely secondary to a pancreatic malignancy. Massively dilated gallbladder, likely related to biliary obstruction by the presumed pancreatic mass. Associated mild gallbladder mural thickening and minimal adjacent fat stranding likely reflects superimposed inflammation. She had ERCP 3/3/25 w/ biliary sphincterotomy, insertion of uncovered self-expandable metal stent into the CBD. There was severe stenosis in the distal common bile duct concerning for malignant stricture, cytology performed. By Endoscopic criteria, she is T2N0. Pathology showed head of pancreas mass bx c/w adenocarcinoma, moderately differentiated, bile duct brushings w/ few atypical glandular cells, suspicious for adenocarcinoma.   Main reason for which patient is referred today is to discuss pain and symptom management. She is accompanied by her daughter, Chyna.  Pt has received various oncologic opinions and has decided to pursue a palliative/comfort-focused approach. She has been suffering with pain in her RUQ/epigastrium and was previously using hydromorphone 2mg very frequently.  She was started on opioid regimen and pain is presently better controlled.   Interval history (4/22/25): Patient seen via telemedicine for palliative medicine follow up.  RUQ pain is well controlled on her current pain regimen listed below. Chronic lower back pain has increased lately; it is exacerbated with ambulation. She was previously managing this pain with PRN Tylenol and sparing use of Advil; she has not used this lately as she was unsure it was safe with her pain regimen. Nausea and vomiting has resolved with the addition of olanzapine 2.5mg QHS to her regimen. Appetite is good, she is focusing on small frequent meals. She is happy to report she has regained some weight back. She is moving her bowels daily with Senna 1 tablet daily.  She reports a few days of fatigue, requiring an afternoon nap. Some days the fatigue is more prominent than others. Sleeping well. Mood is good. She went to the movies yesterday.   Current analgesic regimen: Methadone 2.5mg BID  Hydromorphone 2mg PRN (has not used lately)  ROS: +b/l LE edema - chronic issue form chronic venous insufficiency - on furosemide 20mg + constipation - controlled with senna and PRN miralax daily; no longer having any bleeding from rectum +chronic back pain - limits her ability to walk a long distance Denies trouble sleeping  Uses a walking stick for assistance with ambulation. She had a fall two months ago. She wears a life alert wristwatch.  Patient is , lives alone in a town house with an elevator. She has 3 cats (Emperatriz, Miguel and Gunjan). She has 3 children (Chyna, Dick, Mallory), 5 grandchildren. Chyna is her HCP. 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  She enjoys photography, driving. Not Cheondoism or spiritual.  She works part time for [employer], teaching senior citizens computer skills.   PCP: Dr. Reagan Ma Vascular: Dr. Ryder ([facility])   I-STOP Ref#: [id number]

## 2025-05-04 NOTE — REASON FOR VISIT
[Home] : at home, [unfilled] , at the time of the visit. [Medical Office: ([facility])___] : at the medical office located in  [Telehealth (audio & video)] : This visit was provided via telehealth using real-time 2-way audio visual technology. [Verbal consent obtained from patient] : the patient, [unfilled] [Follow-Up] : a follow-up visit [Other: _____] : [unfilled]

## 2025-05-04 NOTE — REASON FOR VISIT
[Home] : at home, [unfilled] , at the time of the visit. [Medical Office: ([facility])___] : at the medical office located in  [Telehealth (audio & video)] : This visit was provided via telehealth using real-time 2-way audio visual technology. [Verbal consent obtained from patient] : the patient, [unfilled] [Follow-Up] : a follow-up visit [Other: _____] : [unfilled]

## 2025-05-05 NOTE — ASSESSMENT
[______] : HCP: [unfilled] [FreeTextEntry1] : 82yoF with:   # Pancreatic Adenocarcinoma - patient has elected a palliative/comfort-focused approach.    # Nausea - C/w olanzapine 2.5mg QHS.   # Pain 2/2 Neoplasm/Chronic back pain - Controlled. C/w Methadone 2.5mg BID, PRN Hydromorphone 2mg.  - May use PRN Tylenol for mild to moderate pain. Cautioned patient of maximum dosage of Tylenol and prefer usage remain under 3gms/day. - Educated on the importance of maintaining bowel regularity in light of opioid use.  # Fatigue - likely related to cancer diagnosis. Encourage patient to schedule 1 to 2 short naps daily to restore energy. Stressed importance of maintaining physical activity daily - short walks, exercising upper and lower extremities while seated.   # b/l LE edema - chronic issue 2/2 venous insufficiency. Wears compression stockings and uses furosemide 20mg daily.  She is now off of her antihypertensive due to low BPs.   #  Encounter for palliative care- emotional support provided. Patient has HCP and advance directive forms at home. Requested a copy be sent in for our records.   Follow up in 3 weeks, call sooner with questions or issues.

## 2025-05-05 NOTE — PHYSICAL EXAM
Called and spoke with pt.  Told her to cont. to take her iron as directed.  Verbalizes understanding and agrees to plan.   [General Appearance - Alert] : alert [General Appearance - In No Acute Distress] : in no acute distress [Oriented To Time, Place, And Person] : oriented to person, place, and time [Affect] : the affect was normal [FreeTextEntry1] : anxious

## 2025-05-05 NOTE — HISTORY OF PRESENT ILLNESS
[FreeTextEntry1] : 82yoF with recently diagnosed pancreatic adenocarcinoma presents for follow up palliative care visit.  PMH significant for cataracts, Carotid Thromboendarterectomy, GERD, HTN, hyperlipidemia, hypothyroidism, osteopenia, chronic back pain, PAD, scoliosis, chronic venous insufficiency, b/l knee replacements.  Patient was in [place] for the annual flower show in late February when on 2/27/25 at her sister's house she developed chills, vomiting. She presented to [facility] ED 3/1/25 with RUQ pain, n/v, fatigue.  Labs notable for , , Alk Phos 510, bilirubin 2/7, lipase 458. CT AP without contrast showed biliary and pancreatic ductal dilation likely secondary to a pancreatic malignancy. Massively dilated gallbladder, likely related to biliary obstruction by the presumed pancreatic mass. Associated mild gallbladder mural thickening and minimal adjacent fat stranding likely reflects superimposed inflammation. She had ERCP 3/3/25 w/ biliary sphincterotomy, insertion of uncovered self-expandable metal stent into the CBD. There was severe stenosis in the distal common bile duct concerning for malignant stricture, cytology performed. By Endoscopic criteria, she is T2N0. Pathology showed head of pancreas mass bx c/w adenocarcinoma, moderately differentiated, bile duct brushings w/ few atypical glandular cells, suspicious for adenocarcinoma.   Main reason for which patient is referred today is to discuss pain and symptom management. She is accompanied by her daughter, Chyna.  Pt has received various oncologic opinions and has decided to pursue a palliative/comfort-focused approach. She has been suffering with pain in her RUQ/epigastrium and was previously using hydromorphone 2mg very frequently.  She was started on opioid regimen and pain is presently better controlled.   Interval history (5/5/25): Patient seen via telemedicine for palliative medicine follow up.  Has noticed that heavier, richer foods tend to spur nausea in her.  RUQ pain is well controlled on her current pain regimen. Chronic lower back pain has increased lately; it is exacerbated with ambulation. Is using Tylenol 1000mg PRN, sometimes Advil.  Appetite is good, she is focusing on small frequent meals. She is happy to report she has regained some weight back. She is moving her bowels daily with Senna 1 tablet daily.  Has stopped using Benicar for about the past week due dizziness and low BPs.   Current analgesic regimen: Methadone 2.5mg BID  Hydromorphone 2mg PRN (has not used lately)  ROS: +b/l LE edema - chronic issue form chronic venous insufficiency - on furosemide 20mg + constipation - controlled with senna and PRN miralax daily; no longer having any bleeding from rectum +chronic back pain - limits her ability to walk a long distance +mood has been good lately - she has a week alone without family visiting with her and she is happy about this as she wants some alone time Denies trouble sleeping, sleep has always been good for her.   Uses a walking stick for assistance with ambulation. She had a fall two months ago. She wears a life alert wristwatch.  Patient is , lives alone in a town house with an elevator. She has 3 cats (Emperatriz, Miguel and Gunjan). She has 3 children (Chyna, Dick, Mallory), 5 grandchildren. Chyna is her HCP. 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  She enjoys photography, driving. Not Hoahaoism or spiritual.  She works part time for [employer], teaching senior citizens computer skills.   PCP: Dr. Reagan Ma Vascular: Dr. Ryder ([facility])   I-STOP Ref#: [id number]

## 2025-05-05 NOTE — DATA REVIEWED
[FreeTextEntry1] : CT Chest (3/14/2025) FINDINGS:  LUNGS/AIRWAYS/PLEURA:Patent trachea and bronchi. Right lower lobe calcified granuloma. Trace left pleural effusion.  LYMPH NODES/MEDIASTINUM: Moderate hiatal hernia. No lymphadenopathy.  HEART/VASCULATURE: Normal sized heart and aorta. No pericardial effusion. Coronary artery calcifications.  UPPER ABDOMEN: Partially included pneumobilia.  BONES/SOFT TISSUES: Multilevel spondylosis.  IMPRESSION: Trace left pleural effusion.  ///////////////////////////////////////////////////////////////////////////////////////////////////////////////////// US NINA LE (3/14/2025) FINDINGS:  RIGHT: Normal compressibility of the RIGHT common femoral, femoral and popliteal veins. Doppler examination shows normal spontaneous and phasic flow. No RIGHT calf vein thrombosis is detected.  LEFT: Normal compressibility of the LEFT common femoral, femoral and popliteal veins. Doppler examination shows normal spontaneous and phasic flow. No LEFT calf vein thrombosis is detected.  IMPRESSION: No evidence of deep venous thrombosis in either lower extremity.

## 2025-05-05 NOTE — HISTORY OF PRESENT ILLNESS
[FreeTextEntry1] : 82yoF with recently diagnosed pancreatic adenocarcinoma presents for follow up palliative care visit.  PMH significant for cataracts, Carotid Thromboendarterectomy, GERD, HTN, hyperlipidemia, hypothyroidism, osteopenia, chronic back pain, PAD, scoliosis, chronic venous insufficiency, b/l knee replacements.  Patient was in [place] for the annual flower show in late February when on 2/27/25 at her sister's house she developed chills, vomiting. She presented to [facility] ED 3/1/25 with RUQ pain, n/v, fatigue.  Labs notable for , , Alk Phos 510, bilirubin 2/7, lipase 458. CT AP without contrast showed biliary and pancreatic ductal dilation likely secondary to a pancreatic malignancy. Massively dilated gallbladder, likely related to biliary obstruction by the presumed pancreatic mass. Associated mild gallbladder mural thickening and minimal adjacent fat stranding likely reflects superimposed inflammation. She had ERCP 3/3/25 w/ biliary sphincterotomy, insertion of uncovered self-expandable metal stent into the CBD. There was severe stenosis in the distal common bile duct concerning for malignant stricture, cytology performed. By Endoscopic criteria, she is T2N0. Pathology showed head of pancreas mass bx c/w adenocarcinoma, moderately differentiated, bile duct brushings w/ few atypical glandular cells, suspicious for adenocarcinoma.   Main reason for which patient is referred today is to discuss pain and symptom management. She is accompanied by her daughter, Chyna.  Pt has received various oncologic opinions and has decided to pursue a palliative/comfort-focused approach. She has been suffering with pain in her RUQ/epigastrium and was previously using hydromorphone 2mg very frequently.  She was started on opioid regimen and pain is presently better controlled.   Interval history (5/5/25): Patient seen via telemedicine for palliative medicine follow up.  Has noticed that heavier, richer foods tend to spur nausea in her.  RUQ pain is well controlled on her current pain regimen. Chronic lower back pain has increased lately; it is exacerbated with ambulation. Is using Tylenol 1000mg PRN, sometimes Advil.  Appetite is good, she is focusing on small frequent meals. She is happy to report she has regained some weight back. She is moving her bowels daily with Senna 1 tablet daily.  Has stopped using Benicar for about the past week due dizziness and low BPs.   Current analgesic regimen: Methadone 2.5mg BID  Hydromorphone 2mg PRN (has not used lately)  ROS: +b/l LE edema - chronic issue form chronic venous insufficiency - on furosemide 20mg + constipation - controlled with senna and PRN miralax daily; no longer having any bleeding from rectum +chronic back pain - limits her ability to walk a long distance +mood has been good lately - she has a week alone without family visiting with her and she is happy about this as she wants some alone time Denies trouble sleeping, sleep has always been good for her.   Uses a walking stick for assistance with ambulation. She had a fall two months ago. She wears a life alert wristwatch.  Patient is , lives alone in a town house with an elevator. She has 3 cats (Emperatriz, Miguel and Gunjan). She has 3 children (Chyna, Dick, Mallory), 5 grandchildren. Chyna is her HCP. 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  She enjoys photography, driving. Not Nondenominational or spiritual.  She works part time for [employer], teaching senior citizens computer skills.   PCP: Dr. Reagan Ma Vascular: Dr. Ryder ([facility])   I-STOP Ref#: [id number]

## 2025-05-26 NOTE — REASON FOR VISIT
[Home] : at home, [unfilled] , at the time of the visit. [Medical Office: ([facility])___] : at the medical office located in  [Telehealth (audio & video)] : This visit was provided via telehealth using real-time 2-way audio visual technology. [Verbal consent obtained from patient] : the patient, [unfilled] [Follow-Up] : a follow-up visit [Other: _____] : [unfilled]

## 2025-05-26 NOTE — REASON FOR VISIT
[Home] : at home, [unfilled] , at the time of the visit. [Medical Office: ([facility])___] : at the medical office located in  [Telehealth (audio & video)] : This visit was provided via telehealth using real-time 2-way audio visual technology. [Verbal consent obtained from patient] : the patient, [unfilled] [Follow-Up] : a follow-up visit [Other: _____] : [unfilled]

## 2025-05-27 NOTE — PHYSICAL EXAM
[General Appearance - Alert] : alert [General Appearance - In No Acute Distress] : in no acute distress [Oriented To Time, Place, And Person] : oriented to person, place, and time [FreeTextEntry1] : anxious [Affect] : the affect was normal [General Appearance - Well Developed] : well developed [Mood] : the mood was normal

## 2025-05-27 NOTE — PHYSICAL EXAM
[General Appearance - Alert] : alert [General Appearance - In No Acute Distress] : in no acute distress [Oriented To Time, Place, And Person] : oriented to person, place, and time [Affect] : the affect was normal [FreeTextEntry1] : anxious [General Appearance - Well Developed] : well developed [Mood] : the mood was normal

## 2025-05-27 NOTE — HISTORY OF PRESENT ILLNESS
[FreeTextEntry1] : 82yoF with recently diagnosed pancreatic adenocarcinoma presents for follow up palliative care visit.  PMH significant for cataracts, Carotid Thromboendarterectomy, GERD, HTN, hyperlipidemia, hypothyroidism, osteopenia, chronic back pain, PAD, scoliosis, chronic venous insufficiency, b/l knee replacements.  Patient was in [place] for the annual flower show in late February when on 2/27/25 at her sister's house she developed chills, vomiting. She presented to [facility] ED 3/1/25 with RUQ pain, n/v, fatigue.  Labs notable for , , Alk Phos 510, bilirubin 2/7, lipase 458. CT AP without contrast showed biliary and pancreatic ductal dilation likely secondary to a pancreatic malignancy. Massively dilated gallbladder, likely related to biliary obstruction by the presumed pancreatic mass. Associated mild gallbladder mural thickening and minimal adjacent fat stranding likely reflects superimposed inflammation. She had ERCP 3/3/25 w/ biliary sphincterotomy, insertion of uncovered self-expandable metal stent into the CBD. There was severe stenosis in the distal common bile duct concerning for malignant stricture, cytology performed. By Endoscopic criteria, she is T2N0. Pathology showed head of pancreas mass bx c/w adenocarcinoma, moderately differentiated, bile duct brushings w/ few atypical glandular cells, suspicious for adenocarcinoma.   Main reason for which patient is referred today is to discuss pain and symptom management. She is accompanied by her daughter, Chyna.  Pt has received various oncologic opinions and has decided to pursue a palliative/comfort-focused approach. She has been suffering with pain in her RUQ/epigastrium and was previously using hydromorphone 2mg very frequently.  She was started on opioid regimen and pain is presently better controlled.   Interval history (5/27/25): Patient seen via telemedicine for palliative medicine follow up.  RUQ pain is well controlled on her current pain regimen. She has been more aware of the discomfort lately, particularly with taking a deep breath. Pain does not exceed 3/10.  Chronic lower back pain has increased lately; it is exacerbated with ambulation. Is using Tylenol 1000mg in the morning and as-needed, sometimes Advil. Appetite is good, she is focusing on small frequent meals. Heavier, richer foods cause gas discomfort and intermittent nausea. She remains on olanzapine QHS and utilizes gas-x as needed.  Weight has now stabilized; she monitors her weight daily at home: today 141.8lb.  Her weight was down to 137lb a few weeks ago. During this time, she experienced dizziness and low blood pressure. She continues to monitor her blood pressure daily and is no longer takin Benicar.  She is moving her bowels daily with Senna 1 tablet daily.  Mood is stable. She has an upcoming girls trip to [place] which she is very much looking forward to.  Current analgesic regimen: Methadone 2.5mg BID  Hydromorphone 2mg PRN (has not used lately)  ROS: +b/l LE edema - chronic issue form chronic venous insufficiency - on furosemide 20mg + constipation - controlled with senna and PRN miralax daily; no longer having any bleeding from rectum +chronic back pain - limits her ability to walk a long distance +mood has been good lately - she has a week alone without family visiting with her and she is happy about this as she wants some alone time Denies trouble sleeping, sleep has always been good for her.   Uses a walking stick for assistance with ambulation. She had a fall two months ago. She wears a life alert wristwatch.  Patient is , lives alone in a town house with an elevator. She has 3 cats (Emperatriz, Miguel and Gunjan). She has 3 children (Chyna, Dick, Mallory), 5 grandchildren. Chyna is her HCP. 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  She enjoys photography, driving. Not Congregational or spiritual.  She works part time for [employer], teaching senior citizens computer skills.   PCP: Dr. Reagan Ma Vascular: Dr. Ryder ([facility])   I-STOP Ref#: [id number]

## 2025-05-27 NOTE — ASSESSMENT
[______] : HCP: [unfilled] [FreeTextEntry1] : 82yoF with:   # Pancreatic Adenocarcinoma - patient has elected a palliative/comfort-focused approach.    # Nausea/unintentional weight loss - C/w olanzapine 2.5mg QHS. Recommend small, frequent meals focused on high-calorie, high-protein, nutrient dense healthy foods.  # Pain 2/2 Neoplasm/Chronic back pain - C/w Methadone 2.5mg BID, PRN Hydromorphone 2mg for severe pain.  - May use PRN Tylenol for mild to moderate pain. Cautioned patient of maximum dosage of Tylenol and prefer usage remain under 3gms/day. - Educated on the importance of maintaining bowel regularity in light of opioid use.  # Fatigue - likely related to cancer diagnosis. Encourage patient to schedule 1 to 2 short naps daily to restore energy. Stressed importance of maintaining physical activity daily - short walks, exercising upper and lower extremities while seated.   # b/l LE edema - chronic issue 2/2 venous insufficiency. Wears compression stockings and uses furosemide 20mg daily.  She is now off of her antihypertensive due to low BPs.   #  Encounter for palliative care- emotional support provided. Patient has HCP and advance directive forms at home. Requested a copy be sent in for our records.   Follow up in 4 weeks, call sooner with questions or issues.

## 2025-05-27 NOTE — HISTORY OF PRESENT ILLNESS
[FreeTextEntry1] : 82yoF with recently diagnosed pancreatic adenocarcinoma presents for follow up palliative care visit.  PMH significant for cataracts, Carotid Thromboendarterectomy, GERD, HTN, hyperlipidemia, hypothyroidism, osteopenia, chronic back pain, PAD, scoliosis, chronic venous insufficiency, b/l knee replacements.  Patient was in [place] for the annual flower show in late February when on 2/27/25 at her sister's house she developed chills, vomiting. She presented to [facility] ED 3/1/25 with RUQ pain, n/v, fatigue.  Labs notable for , , Alk Phos 510, bilirubin 2/7, lipase 458. CT AP without contrast showed biliary and pancreatic ductal dilation likely secondary to a pancreatic malignancy. Massively dilated gallbladder, likely related to biliary obstruction by the presumed pancreatic mass. Associated mild gallbladder mural thickening and minimal adjacent fat stranding likely reflects superimposed inflammation. She had ERCP 3/3/25 w/ biliary sphincterotomy, insertion of uncovered self-expandable metal stent into the CBD. There was severe stenosis in the distal common bile duct concerning for malignant stricture, cytology performed. By Endoscopic criteria, she is T2N0. Pathology showed head of pancreas mass bx c/w adenocarcinoma, moderately differentiated, bile duct brushings w/ few atypical glandular cells, suspicious for adenocarcinoma.   Main reason for which patient is referred today is to discuss pain and symptom management. She is accompanied by her daughter, Chyna.  Pt has received various oncologic opinions and has decided to pursue a palliative/comfort-focused approach. She has been suffering with pain in her RUQ/epigastrium and was previously using hydromorphone 2mg very frequently.  She was started on opioid regimen and pain is presently better controlled.   Interval history (5/27/25): Patient seen via telemedicine for palliative medicine follow up.  RUQ pain is well controlled on her current pain regimen. She has been more aware of the discomfort lately, particularly with taking a deep breath. Pain does not exceed 3/10.  Chronic lower back pain has increased lately; it is exacerbated with ambulation. Is using Tylenol 1000mg in the morning and as-needed, sometimes Advil. Appetite is good, she is focusing on small frequent meals. Heavier, richer foods cause gas discomfort and intermittent nausea. She remains on olanzapine QHS and utilizes gas-x as needed.  Weight has now stabilized; she monitors her weight daily at home: today 141.8lb.  Her weight was down to 137lb a few weeks ago. During this time, she experienced dizziness and low blood pressure. She continues to monitor her blood pressure daily and is no longer takin Benicar.  She is moving her bowels daily with Senna 1 tablet daily.  Mood is stable. She has an upcoming girls trip to [place] which she is very much looking forward to.  Current analgesic regimen: Methadone 2.5mg BID  Hydromorphone 2mg PRN (has not used lately)  ROS: +b/l LE edema - chronic issue form chronic venous insufficiency - on furosemide 20mg + constipation - controlled with senna and PRN miralax daily; no longer having any bleeding from rectum +chronic back pain - limits her ability to walk a long distance +mood has been good lately - she has a week alone without family visiting with her and she is happy about this as she wants some alone time Denies trouble sleeping, sleep has always been good for her.   Uses a walking stick for assistance with ambulation. She had a fall two months ago. She wears a life alert wristwatch.  Patient is , lives alone in a town house with an elevator. She has 3 cats (Emperatriz, Miguel and Gunjan). She has 3 children (Chyna, Dick, Mallory), 5 grandchildren. Chyna is her HCP. 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  She enjoys photography, driving. Not Jain or spiritual.  She works part time for [employer], teaching senior citizens computer skills.   PCP: Dr. Reagan Ma Vascular: Dr. Ryder ([facility] [place] [facility])   I-STOP Ref#: [id number]

## 2025-06-02 NOTE — HISTORY OF PRESENT ILLNESS
[FreeTextEntry1] : pt here for pessary check. Since last visit she was dx with stage 2 pancreatic cancer. She has chosen palliative care. She feels ok physically most of the time, just tired. She is sad.

## 2025-07-02 NOTE — REASON FOR VISIT
[Home] : at home, [unfilled] , at the time of the visit. [Medical Office: ([facility])___] : at the medical office located in  [Telehealth (audio & video)] : This visit was provided via telehealth using real-time 2-way audio visual technology. [Verbal consent obtained from patient] : the patient, [unfilled] [Follow-Up] : a follow-up visit [Other: _____] : [unfilled]

## 2025-07-02 NOTE — REASON FOR VISIT
[Home] : at home, [unfilled] , at the time of the visit. [Medical Office: ([facility])___] : at the medical office located in  [Telehealth (audio & video)] : This visit was provided via telehealth using real-time 2-way audio visual technology. [Verbal consent obtained from patient] : the patient, [unfilled] [Follow-Up] : a follow-up visit [Other: _____] : [unfilled]

## 2025-07-02 NOTE — REASON FOR VISIT
[Home] : at home, [unfilled] , at the time of the visit. [Medical Office: ([facility])___] : at the medical office located in  [Telehealth (audio & video)] : This visit was provided via telehealth using real-time 2-way audio visual technology. [Verbal consent obtained from patient] : the patient, [unfilled] [Follow-Up] : a follow-up visit [Other: _____] : [unfilled]

## 2025-07-02 NOTE — PHYSICAL EXAM
[General Appearance - Alert] : alert [General Appearance - In No Acute Distress] : in no acute distress [General Appearance - Well Developed] : well developed [Oriented To Time, Place, And Person] : oriented to person, place, and time [Impaired Insight] : insight and judgment were intact [Affect] : the affect was normal [Mood] : the mood was normal

## 2025-07-08 NOTE — HISTORY OF PRESENT ILLNESS
[FreeTextEntry1] : 82yoF with pancreatic adenocarcinoma presents for follow up palliative care visit.  PMH significant for cataracts, Carotid Thromboendarterectomy, GERD, HTN, hyperlipidemia, hypothyroidism, osteopenia, chronic back pain, PAD, scoliosis, chronic venous insufficiency, b/l knee replacements.  Patient was in [place] for the annual flower show in late February when on 2/27/25 at her sister's house she developed chills, vomiting. She presented to [facility] ED 3/1/25 with RUQ pain, n/v, fatigue.  Labs notable for , , Alk Phos 510, bilirubin 2/7, lipase 458. CT AP without contrast showed biliary and pancreatic ductal dilation likely secondary to a pancreatic malignancy. Massively dilated gallbladder, likely related to biliary obstruction by the presumed pancreatic mass. Associated mild gallbladder mural thickening and minimal adjacent fat stranding likely reflects superimposed inflammation. She had ERCP 3/3/25 w/ biliary sphincterotomy, insertion of uncovered self-expandable metal stent into the CBD. There was severe stenosis in the distal common bile duct concerning for malignant stricture, cytology performed. By Endoscopic criteria, she is T2N0. Pathology showed head of pancreas mass bx c/w adenocarcinoma, moderately differentiated, bile duct brushings w/ few atypical glandular cells, suspicious for adenocarcinoma.   Main reason for which patient is referred today is to discuss pain and symptom management. She is accompanied by her daughter, Chyna.  Pt has received various oncologic opinions and has decided to pursue a palliative/comfort-focused approach. She has been suffering with pain in her RUQ/epigastrium and was previously using hydromorphone 2mg very frequently.  She was started on opioid regimen and pain is presently better controlled.   Interval history (7/3/25): Patient seen via telemedicine for palliative medicine follow up.  Continues to experience pain in her L chest wall It is a constant 2/10 dull pain and increases to 7/10 sharp pain with deep inspiration. Denies dyspnea, cough, heart palpitations.  Chronic back pain is stable. She remains on methadone 2.5mg BID. Is using Tylenol 1000mg twice daily, this was not effective on her new pain.  She is moving her bowels daily with Senna 1 tablet daily.  Swelling to her bilateral lower extremities, left is more significant by the end of the day. She wears compression stockings daily and elevates her legs when in bed.   Received a rollator walker but has found that her back pain heightens when using it. Is inquiring about other options for walkers that won't exacerbate her back pain. Wears a life alert wristwatch.  Current analgesic regimen: Methadone 2.5mg BID  Hydromorphone 2mg PRN (has not used lately)  ROS: +diminished appetite - weight fluctuates 140-144lbs +b/l LE edema - chronic issue form chronic venous insufficiency - on furosemide 20mg daily; has been worse lately, is asking if she can increase to 40mg for a short course + constipation - controlled with senna and PRN miralax daily; no longer having any bleeding from rectum +chronic back pain - limits her ability to walk a long distance +mood has been pretty good - has ups and downs Denies trouble sleeping, sleep has always been good for her.   Patient is , lives alone in a town house with an elevator. She has 3 cats (Emperatriz, Miguel and Gunjan). She has 3 children (Chyna, Dick, Mallory), 5 grandchildren. Chyna is her HCP. 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  She enjoys photography, driving. Not Episcopalian or spiritual.  She works part time for [employer], teaching senior citizens computer skills. Enjoys this work very much.  PCP: Dr. Reagan Ma Vascular: Dr. Ryder ([facility])   I-STOP Ref#: [id number]

## 2025-07-08 NOTE — HISTORY OF PRESENT ILLNESS
[FreeTextEntry1] : 82yoF with pancreatic adenocarcinoma presents for follow up palliative care visit.  PMH significant for cataracts, Carotid Thromboendarterectomy, GERD, HTN, hyperlipidemia, hypothyroidism, osteopenia, chronic back pain, PAD, scoliosis, chronic venous insufficiency, b/l knee replacements.  Patient was in [place] for the annual flower show in late February when on 2/27/25 at her sister's house she developed chills, vomiting. She presented to [facility] ED 3/1/25 with RUQ pain, n/v, fatigue.  Labs notable for , , Alk Phos 510, bilirubin 2/7, lipase 458. CT AP without contrast showed biliary and pancreatic ductal dilation likely secondary to a pancreatic malignancy. Massively dilated gallbladder, likely related to biliary obstruction by the presumed pancreatic mass. Associated mild gallbladder mural thickening and minimal adjacent fat stranding likely reflects superimposed inflammation. She had ERCP 3/3/25 w/ biliary sphincterotomy, insertion of uncovered self-expandable metal stent into the CBD. There was severe stenosis in the distal common bile duct concerning for malignant stricture, cytology performed. By Endoscopic criteria, she is T2N0. Pathology showed head of pancreas mass bx c/w adenocarcinoma, moderately differentiated, bile duct brushings w/ few atypical glandular cells, suspicious for adenocarcinoma.   Main reason for which patient is referred today is to discuss pain and symptom management. She is accompanied by her daughter, Chyna.  Pt has received various oncologic opinions and has decided to pursue a palliative/comfort-focused approach. She has been suffering with pain in her RUQ/epigastrium and was previously using hydromorphone 2mg very frequently.  She was started on opioid regimen and pain is presently better controlled.   Interval history (7/3/25): Patient seen via telemedicine for palliative medicine follow up.  Continues to experience pain in her L chest wall It is a constant 2/10 dull pain and increases to 7/10 sharp pain with deep inspiration. Denies dyspnea, cough, heart palpitations.  Chronic back pain is stable. She remains on methadone 2.5mg BID. Is using Tylenol 1000mg twice daily, this was not effective on her new pain.  She is moving her bowels daily with Senna 1 tablet daily.  Swelling to her bilateral lower extremities, left is more significant by the end of the day. She wears compression stockings daily and elevates her legs when in bed.   Received a rollator walker but has found that her back pain heightens when using it. Is inquiring about other options for walkers that won't exacerbate her back pain. Wears a life alert wristwatch.  Current analgesic regimen: Methadone 2.5mg BID  Hydromorphone 2mg PRN (has not used lately)  ROS: +diminished appetite - weight fluctuates 140-144lbs +b/l LE edema - chronic issue form chronic venous insufficiency - on furosemide 20mg daily; has been worse lately, is asking if she can increase to 40mg for a short course + constipation - controlled with senna and PRN miralax daily; no longer having any bleeding from rectum +chronic back pain - limits her ability to walk a long distance +mood has been pretty good - has ups and downs Denies trouble sleeping, sleep has always been good for her.   Patient is , lives alone in a town house with an elevator. She has 3 cats (Emperatriz, Miguel and Gunjan). She has 3 children (Chyna, Dick, Mallory), 5 grandchildren. Chyna is her HCP. 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  She enjoys photography, driving. Not Spiritism or spiritual.  She works part time for [employer], teaching senior citizens computer skills. Enjoys this work very much.  PCP: Dr. Reagan Ma Vascular: Dr. Ryder ([facility])   I-STOP Ref#: [id number]

## 2025-07-08 NOTE — HISTORY OF PRESENT ILLNESS
[FreeTextEntry1] : 82yoF with pancreatic adenocarcinoma presents for follow up palliative care visit.  PMH significant for cataracts, Carotid Thromboendarterectomy, GERD, HTN, hyperlipidemia, hypothyroidism, osteopenia, chronic back pain, PAD, scoliosis, chronic venous insufficiency, b/l knee replacements.  Patient was in [place] for the annual flower show in late February when on 2/27/25 at her sister's house she developed chills, vomiting. She presented to [facility] ED 3/1/25 with RUQ pain, n/v, fatigue.  Labs notable for , , Alk Phos 510, bilirubin 2/7, lipase 458. CT AP without contrast showed biliary and pancreatic ductal dilation likely secondary to a pancreatic malignancy. Massively dilated gallbladder, likely related to biliary obstruction by the presumed pancreatic mass. Associated mild gallbladder mural thickening and minimal adjacent fat stranding likely reflects superimposed inflammation. She had ERCP 3/3/25 w/ biliary sphincterotomy, insertion of uncovered self-expandable metal stent into the CBD. There was severe stenosis in the distal common bile duct concerning for malignant stricture, cytology performed. By Endoscopic criteria, she is T2N0. Pathology showed head of pancreas mass bx c/w adenocarcinoma, moderately differentiated, bile duct brushings w/ few atypical glandular cells, suspicious for adenocarcinoma.   Main reason for which patient is referred today is to discuss pain and symptom management. She is accompanied by her daughter, Chyna.  Pt has received various oncologic opinions and has decided to pursue a palliative/comfort-focused approach. She has been suffering with pain in her RUQ/epigastrium and was previously using hydromorphone 2mg very frequently.  She was started on opioid regimen and pain is presently better controlled.   Interval history (7/3/25): Patient seen via telemedicine for palliative medicine follow up.  Continues to experience pain in her L chest wall It is a constant 2/10 dull pain and increases to 7/10 sharp pain with deep inspiration. Denies dyspnea, cough, heart palpitations.  Chronic back pain is stable. She remains on methadone 2.5mg BID. Is using Tylenol 1000mg twice daily, this was not effective on her new pain.  She is moving her bowels daily with Senna 1 tablet daily.  Swelling to her bilateral lower extremities, left is more significant by the end of the day. She wears compression stockings daily and elevates her legs when in bed.   Received a rollator walker but has found that her back pain heightens when using it. Is inquiring about other options for walkers that won't exacerbate her back pain. Wears a life alert wristwatch.  Current analgesic regimen: Methadone 2.5mg BID  Hydromorphone 2mg PRN (has not used lately)  ROS: +diminished appetite - weight fluctuates 140-144lbs +b/l LE edema - chronic issue form chronic venous insufficiency - on furosemide 20mg daily; has been worse lately, is asking if she can increase to 40mg for a short course + constipation - controlled with senna and PRN miralax daily; no longer having any bleeding from rectum +chronic back pain - limits her ability to walk a long distance +mood has been pretty good - has ups and downs Denies trouble sleeping, sleep has always been good for her.   Patient is , lives alone in a town house with an elevator. She has 3 cats (Emperatriz, Miguel and Gunjan). She has 3 children (Chyna, Dick, Mallory), 5 grandchildren. Chyna is her HCP. 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  She enjoys photography, driving. Not Restorationism or spiritual.  She works part time for [employer], teaching senior citizens computer skills. Enjoys this work very much.  PCP: Dr. Reagan Ma Vascular: Dr. Ryder ([facility])   I-STOP Ref#: [id number]

## 2025-07-08 NOTE — ASSESSMENT
[______] : HCP: [unfilled] [FreeTextEntry1] : 82yoF with:   # Pancreatic Adenocarcinoma - patient has elected a palliative/comfort-focused approach.    # Pain 2/2 Neoplasm/Chronic back pain - C/w Methadone 2.5mg BID and PRN Hydromorphone 2mg for severe pain. Recommend utilizing PRN hydromorphone when Tylenol is ineffective.  - May use PRN Tylenol for mild to moderate pain. Cautioned patient of maximum dosage of Tylenol and prefer usage remain under 3gms/day. - Educated on the importance of maintaining bowel regularity in light of opioid use.  # Nausea/unintentional weight loss - C/w olanzapine 2.5mg QHS. Recommend utilizing PRN ondansetron when she experiences episodes of queasiness & prior to meals.  - Recommend small, frequent meals focused on high-calorie, high-protein, nutrient dense healthy foods.  # Fatigue - likely related to cancer diagnosis. Encourage patient to schedule 1 to 2 short naps daily to restore energy. Stressed importance of maintaining physical activity daily - short walks, exercising upper and lower extremities while seated.   # b/l LE edema - chronic issue 2/2 venous insufficiency. Wears compression stockings and uses furosemide 20mg daily.  She is now off of her antihypertensive due to low BPs.   # Physical debility - Patient is limited in her mobility due to her ongoing back pain. Now has rolling walker but is finding it to exacerbate her back pain when using it. Will look into how patient may test out other walker varieties and/or be fitted for one.  #  Encounter for palliative care- emotional support provided. Patient has HCP and advance directive forms at home. Requested a copy be sent in for our records.   Follow up in 1 month, call sooner with questions or issues.